# Patient Record
(demographics unavailable — no encounter records)

---

## 2025-07-10 NOTE — HISTORY OF PRESENT ILLNESS
[FreeTextEntry1] : 21yo, G0, LMP 6/13/25 Declines STD screening  Gyn Hx: Menrche   irregluar cycles, 4-6d flow Loestrin BTB stopped in May, 21 day cycle, heavy painful periods after stopping, breast pain all the time  Sexually active, monogamous